# Patient Record
Sex: FEMALE | Race: WHITE | NOT HISPANIC OR LATINO | Employment: FULL TIME | URBAN - METROPOLITAN AREA
[De-identification: names, ages, dates, MRNs, and addresses within clinical notes are randomized per-mention and may not be internally consistent; named-entity substitution may affect disease eponyms.]

---

## 2018-07-12 PROBLEM — R19.7 DIARRHEA: Status: ACTIVE | Noted: 2018-07-12

## 2023-10-16 ENCOUNTER — TELEPHONE (OUTPATIENT)
Dept: PAIN MEDICINE | Facility: CLINIC | Age: 53
End: 2023-10-16
Payer: COMMERCIAL

## 2023-10-16 NOTE — TELEPHONE ENCOUNTER
----- Message from Sivan Easley sent at 10/14/2023  8:21 AM CDT -----  Contact: pt  Type: Needs Medical Advice  Who Called: pt  Best Call Back Number: [phone]    Additional Information: Pt is calling the office needs an appt has referral in.Please call back and advise.

## 2023-10-19 ENCOUNTER — OFFICE VISIT (OUTPATIENT)
Dept: PAIN MEDICINE | Facility: CLINIC | Age: 53
End: 2023-10-19
Payer: COMMERCIAL

## 2023-10-19 VITALS
SYSTOLIC BLOOD PRESSURE: 148 MMHG | HEIGHT: 69 IN | WEIGHT: 181.19 LBS | DIASTOLIC BLOOD PRESSURE: 80 MMHG | HEART RATE: 84 BPM | BODY MASS INDEX: 26.84 KG/M2

## 2023-10-19 DIAGNOSIS — M50.30 DDD (DEGENERATIVE DISC DISEASE), CERVICAL: Primary | ICD-10-CM

## 2023-10-19 DIAGNOSIS — M54.12 CERVICAL RADICULOPATHY: ICD-10-CM

## 2023-10-19 DIAGNOSIS — M54.2 ACUTE NECK PAIN: ICD-10-CM

## 2023-10-19 PROCEDURE — 99204 PR OFFICE/OUTPT VISIT, NEW, LEVL IV, 45-59 MIN: ICD-10-PCS | Mod: S$GLB,,, | Performed by: ANESTHESIOLOGY

## 2023-10-19 PROCEDURE — 3077F SYST BP >= 140 MM HG: CPT | Mod: CPTII,S$GLB,, | Performed by: ANESTHESIOLOGY

## 2023-10-19 PROCEDURE — 99204 OFFICE O/P NEW MOD 45 MIN: CPT | Mod: S$GLB,,, | Performed by: ANESTHESIOLOGY

## 2023-10-19 PROCEDURE — 1159F PR MEDICATION LIST DOCUMENTED IN MEDICAL RECORD: ICD-10-PCS | Mod: CPTII,S$GLB,, | Performed by: ANESTHESIOLOGY

## 2023-10-19 PROCEDURE — 3077F PR MOST RECENT SYSTOLIC BLOOD PRESSURE >= 140 MM HG: ICD-10-PCS | Mod: CPTII,S$GLB,, | Performed by: ANESTHESIOLOGY

## 2023-10-19 PROCEDURE — 3008F BODY MASS INDEX DOCD: CPT | Mod: CPTII,S$GLB,, | Performed by: ANESTHESIOLOGY

## 2023-10-19 PROCEDURE — 3079F DIAST BP 80-89 MM HG: CPT | Mod: CPTII,S$GLB,, | Performed by: ANESTHESIOLOGY

## 2023-10-19 PROCEDURE — 99999 PR PBB SHADOW E&M-EST. PATIENT-LVL IV: CPT | Mod: PBBFAC,,, | Performed by: ANESTHESIOLOGY

## 2023-10-19 PROCEDURE — 3079F PR MOST RECENT DIASTOLIC BLOOD PRESSURE 80-89 MM HG: ICD-10-PCS | Mod: CPTII,S$GLB,, | Performed by: ANESTHESIOLOGY

## 2023-10-19 PROCEDURE — 1159F MED LIST DOCD IN RCRD: CPT | Mod: CPTII,S$GLB,, | Performed by: ANESTHESIOLOGY

## 2023-10-19 PROCEDURE — 3008F PR BODY MASS INDEX (BMI) DOCUMENTED: ICD-10-PCS | Mod: CPTII,S$GLB,, | Performed by: ANESTHESIOLOGY

## 2023-10-19 PROCEDURE — 99999 PR PBB SHADOW E&M-EST. PATIENT-LVL IV: ICD-10-PCS | Mod: PBBFAC,,, | Performed by: ANESTHESIOLOGY

## 2023-10-19 NOTE — PROGRESS NOTES
"  This note was completed with dictation software and grammatical errors may exist.    Chief Complaint   Patient presents with    Neck Pain        HPI: Lisa Easley is a 53 y.o. year old female patient who has a past medical history of Hypertension and Ulcerative colitis. She presents in referral from [facility] for neck pain.  The patient presents with a history of waking up in the middle of the night with left neck, left arm pain for about 1.5 weeks.  She denies any trauma that may have caused this.  She reports that she is having pain in the left neck, into the triceps with numbness and tingling in the 1st 3 fingers of the left hand.  She feels that she has some weakness to the left arm.  She denies any balance issues.  The pain is worse with trying to use her arm, turning her head side to side.  The pain was so severe that she went to the emergency department, received pain medication which eased up the pain slightly.  However she is still having significant pain.    Pain intervention history:    Spine surgeries:    Antineuropathics:  NSAIDs:  Ibuprofen 800  Physical therapy:  She will be starting physical therapy and [facility] PT.  Antidepressants:  Muscle relaxers:  Flexeril 10 mg  Opioids:  Norco 10/325  Antiplatelets/Anticoagulants:        ROS:  She reports neck pain, numbness, left arm weakness, difficulty sleeping.  Balance of review of systems is negative.    No results found for: "LABA1C", "HGBA1C"    Lab Results   Component Value Date    WBC 7.29 2023    HGB 12.0 2023    HCT 37.3 2023    MCV 92 2023     2023             Past Medical History:   Diagnosis Date    Hypertension     Ulcerative colitis        Past Surgical History:   Procedure Laterality Date    BREAST CYST ASPIRATION Left 2020    BREAST CYST ASPIRATION Left 2021     SECTION      x4    COLONOSCOPY N/A 2018    Procedure: COLONOSCOPY;  Surgeon: Santino Harris MD;  Location: " "[facility];  Service: Endoscopy;  Laterality: N/A;    HYSTERECTOMY  2012    Partial    TUMOR REMOVAL      left upper rib cage, benign       Social History     Socioeconomic History    Marital status:    Tobacco Use    Smoking status: Never    Smokeless tobacco: Never   Substance and Sexual Activity    Alcohol use: No         Medications/Allergies: See med card    Vitals:    10/19/23 1052   BP: (!) 148/80   Pulse: 84   Weight: 82.2 kg (181 lb 3.5 oz)   Height: 5' 9" (1.753 m)   PainSc:   4   PainLoc: Neck     Body mass index is 26.76 kg/m².      10/19/2023    10:48 AM   Last 3 PDI Scores   Pain Disability Index (PDI) 24     Physical exam:  Gen: A and O x3, pleasant, well-groomed  Skin: No rashes or obvious lesions  HEENT: PERRLA, no obvious deformities on ears or in canals.Trachea midline.  CVS: Regular rate and rhythm, normal palpable pulses.  Resp: Clear to auscultation bilaterally, no wheezes or rales.  Abdomen: Soft, NT/ND.  Musculoskeletal:  No antalgic gait.       Neuro:  Motor:    Right Left   C4 Shoulder Abduction  5  5   C5 Elbow Flexion    5  5   C6 Wrist Extension  5  5   C7 Elbow Extension   5  4   C8/T1 Hand Intrinsics   5  5   C8 First Dorsal Interosseus  5  5   C8 Abductor Pollicus Brevis  5  5      Left  Right    Triceps DTR 1+ 2+   Biceps DTR 1+ 2+   Brachioradialis DTR 2+ 2+   Patellar DTR 2+ 2+   Achilles DTR 2+ 2+   Dutton Absent  Absent   Clonus Absent Absent     Babinski Absent Absent     Sensory: Intact and symmetrical to light touch and pinprick in C2-T1 dermatomes bilaterally.  Cervical spine: ROM is full in flexion, extension and lateral rotation with increased pain on extension.  Spurling's maneuver causes no neck pain to either side.  Myofascial exam:  There is tenderness palpation over the left trapezius.    Imaging:  She had an x-ray that apparently showed some degenerative changes, we do not have access to the actual images.    Assessment:  Lisa Easley is a 53 y.o. year old " female patient who has a past medical history of Hypertension and Ulcerative colitis. She presents in referral from [facility] for neck pain.  1. DDD (degenerative disc disease), cervical  MRI Cervical Spine Without Contrast      2. Acute neck pain  Ambulatory referral/consult to Pain Clinic    MRI Cervical Spine Without Contrast      3. Cervical radiculopathy  MRI Cervical Spine Without Contrast          Plan:  1. We discussed that she should go ahead and start physical therapy, continue the muscle relaxant, ibuprofen.  However since she is having some weakness in the left arm, I think we should get an MRI of the cervical spine and I will call her once I have seen this and we discussed possibly setting up an epidural steroid injection.      Thank you for referring this interesting patient, and I look forward to continuing to collaborate in her care.

## 2023-10-19 NOTE — LETTER
October 19, 2023      [facility]  [address]  [place] [address]  Phone: [phone]  Fax: [phone]       Patient: Lisa Easley   YOB: 1970  Date of Visit: 10/19/2023    To Whom It May Concern:    Wilmer Easley  was at [facility] on 10/19/2023. TIf you have any questions or concerns, or if I can be of further assistance, please do not hesitate to contact me.    Sincerely,    Evans Ambrocio MA

## 2023-10-22 ENCOUNTER — PATIENT MESSAGE (OUTPATIENT)
Dept: PAIN MEDICINE | Facility: CLINIC | Age: 53
End: 2023-10-22
Payer: COMMERCIAL

## 2023-10-28 ENCOUNTER — HOSPITAL ENCOUNTER (OUTPATIENT)
Dept: RADIOLOGY | Facility: HOSPITAL | Age: 53
Discharge: HOME | End: 2023-10-28
Attending: ANESTHESIOLOGY
Payer: COMMERCIAL

## 2023-10-28 DIAGNOSIS — M54.12 CERVICAL RADICULOPATHY: ICD-10-CM

## 2023-10-28 DIAGNOSIS — M54.2 ACUTE NECK PAIN: ICD-10-CM

## 2023-10-28 DIAGNOSIS — M50.30 DDD (DEGENERATIVE DISC DISEASE), CERVICAL: ICD-10-CM

## 2023-10-28 PROCEDURE — 72141 MRI NECK SPINE W/O DYE: CPT | Mod: TC,PO

## 2023-10-28 PROCEDURE — 72141 MRI CERVICAL SPINE WITHOUT CONTRAST: ICD-10-PCS | Mod: 26,,, | Performed by: RADIOLOGY

## 2023-10-28 PROCEDURE — 72141 MRI NECK SPINE W/O DYE: CPT | Mod: 26,,, | Performed by: RADIOLOGY

## 2023-10-30 ENCOUNTER — TELEPHONE (OUTPATIENT)
Dept: PAIN MEDICINE | Facility: CLINIC | Age: 53
End: 2023-10-30
Payer: COMMERCIAL

## 2023-10-31 ENCOUNTER — PATIENT MESSAGE (OUTPATIENT)
Dept: PAIN MEDICINE | Facility: CLINIC | Age: 53
End: 2023-10-31
Payer: COMMERCIAL

## 2023-10-31 DIAGNOSIS — M54.12 CERVICAL RADICULOPATHY: Primary | ICD-10-CM

## 2023-10-31 RX ORDER — SODIUM CHLORIDE, SODIUM LACTATE, POTASSIUM CHLORIDE, CALCIUM CHLORIDE 600; 310; 30; 20 MG/100ML; MG/100ML; MG/100ML; MG/100ML
INJECTION, SOLUTION INTRAVENOUS CONTINUOUS
Status: CANCELLED | OUTPATIENT
Start: 2023-10-31

## 2023-10-31 NOTE — TELEPHONE ENCOUNTER
Attempted to reach patient. No answer. Left voicemail to return call to office. [facility] message also sent to patient.   *Patient's case request has been placed and patient would need to choose a date.*

## 2023-11-07 ENCOUNTER — PATIENT MESSAGE (OUTPATIENT)
Dept: PAIN MEDICINE | Facility: CLINIC | Age: 53
End: 2023-11-07
Payer: COMMERCIAL

## 2023-11-08 RX ORDER — CYCLOBENZAPRINE HCL 10 MG
10 TABLET ORAL 3 TIMES DAILY
Qty: 30 TABLET | Refills: 0 | Status: SHIPPED | OUTPATIENT
Start: 2023-11-08 | End: 2023-12-21 | Stop reason: SDUPTHER

## 2023-12-01 ENCOUNTER — TELEPHONE (OUTPATIENT)
Dept: PAIN MEDICINE | Facility: CLINIC | Age: 53
End: 2023-12-01
Payer: COMMERCIAL

## 2023-12-04 ENCOUNTER — TELEPHONE (OUTPATIENT)
Dept: PAIN MEDICINE | Facility: CLINIC | Age: 53
End: 2023-12-04
Payer: COMMERCIAL

## 2023-12-04 NOTE — TELEPHONE ENCOUNTER
----- Message from Nhung Louis sent at 12/1/2023 10:30 AM CST -----  Contact: Patient  Type: Needs Medical Advice    Who Called:  Patient  What is this regarding?:  She is wanting to discuss the surgery scheduled on 12/11. It should be cancelled. She wants to go over her MRI first.  Best Call Back Number:  [phone]  Additional Information:  Please call the patient back at the phone number listed above to advise. Thank you!

## 2023-12-08 ENCOUNTER — TELEPHONE (OUTPATIENT)
Dept: SURGERY | Facility: HOSPITAL | Age: 53
End: 2023-12-08
Payer: COMMERCIAL

## 2023-12-08 NOTE — TELEPHONE ENCOUNTER
The injection is not a surgery. If she wants to see me specifically, she will have to get my next available appointment whereas she can see Joe because he has available appointments.

## 2023-12-08 NOTE — TELEPHONE ENCOUNTER
Good morning,     Ms. Easley is currently scheduled for a cervical PAUL C7/T1 to the left with Dr. Kruse on Monday, 12/11.     Ms. Easley states she has attempted to reschedule this procedure several times. She wants to physically meet with Dr. Kruse to talk to him and review her results prior to scheduling any injections or any surgery.     Ms. Easley does currently have an apt on 12/21 @ 9AM with Giacomo. She states she does not want to see Giacomo, she wants to see Dr. Krsue personally for this appointment. Can we please make sure she is able to see Dr. Kruse on this date/time?     She is requesting that we take her off of the schedule for an PAUL at this time until she has had time to see Dr. Kruse and weigh her options. I will ask Lola to place her in the depot at this time.     Thank you!

## 2023-12-13 ENCOUNTER — TELEPHONE (OUTPATIENT)
Dept: PAIN MEDICINE | Facility: CLINIC | Age: 53
End: 2023-12-13
Payer: COMMERCIAL

## 2023-12-13 NOTE — TELEPHONE ENCOUNTER
----- Message from Amberly Clements sent at 12/12/2023  3:22 PM CST -----  Type:  Patient Returning Call    Who Called:pt     Who Left Message for Patient:Abigail Chan     Does the patient know what this is regarding?:no     Would the patient rather a call back or a response via [facility]? Callback    Best Call Back Number: [phone]      Additional Information:  please advise thank you

## 2023-12-18 ENCOUNTER — TELEPHONE (OUTPATIENT)
Dept: PAIN MEDICINE | Facility: CLINIC | Age: 53
End: 2023-12-18
Payer: COMMERCIAL

## 2023-12-18 NOTE — TELEPHONE ENCOUNTER
As narrow as her spinal canal is, I would not recommend doing traction.  I would not recommend this device.

## 2023-12-21 ENCOUNTER — OFFICE VISIT (OUTPATIENT)
Dept: PAIN MEDICINE | Facility: CLINIC | Age: 53
End: 2023-12-21
Payer: COMMERCIAL

## 2023-12-21 VITALS
HEART RATE: 62 BPM | DIASTOLIC BLOOD PRESSURE: 77 MMHG | WEIGHT: 180.75 LBS | BODY MASS INDEX: 26.77 KG/M2 | HEIGHT: 69 IN | SYSTOLIC BLOOD PRESSURE: 165 MMHG

## 2023-12-21 DIAGNOSIS — M50.30 DDD (DEGENERATIVE DISC DISEASE), CERVICAL: ICD-10-CM

## 2023-12-21 DIAGNOSIS — M54.12 CERVICAL RADICULOPATHY: Primary | ICD-10-CM

## 2023-12-21 PROCEDURE — 3008F BODY MASS INDEX DOCD: CPT | Mod: CPTII,S$GLB,, | Performed by: PHYSICIAN ASSISTANT

## 2023-12-21 PROCEDURE — 99999 PR PBB SHADOW E&M-EST. PATIENT-LVL III: CPT | Mod: PBBFAC,,, | Performed by: PHYSICIAN ASSISTANT

## 2023-12-21 PROCEDURE — 1160F RVW MEDS BY RX/DR IN RCRD: CPT | Mod: CPTII,S$GLB,, | Performed by: PHYSICIAN ASSISTANT

## 2023-12-21 PROCEDURE — 99214 OFFICE O/P EST MOD 30 MIN: CPT | Mod: S$GLB,,, | Performed by: PHYSICIAN ASSISTANT

## 2023-12-21 PROCEDURE — 3077F SYST BP >= 140 MM HG: CPT | Mod: CPTII,S$GLB,, | Performed by: PHYSICIAN ASSISTANT

## 2023-12-21 PROCEDURE — 99214 PR OFFICE/OUTPT VISIT, EST, LEVL IV, 30-39 MIN: ICD-10-PCS | Mod: S$GLB,,, | Performed by: PHYSICIAN ASSISTANT

## 2023-12-21 PROCEDURE — 3078F DIAST BP <80 MM HG: CPT | Mod: CPTII,S$GLB,, | Performed by: PHYSICIAN ASSISTANT

## 2023-12-21 PROCEDURE — 1160F PR REVIEW ALL MEDS BY PRESCRIBER/CLIN PHARMACIST DOCUMENTED: ICD-10-PCS | Mod: CPTII,S$GLB,, | Performed by: PHYSICIAN ASSISTANT

## 2023-12-21 PROCEDURE — 3008F PR BODY MASS INDEX (BMI) DOCUMENTED: ICD-10-PCS | Mod: CPTII,S$GLB,, | Performed by: PHYSICIAN ASSISTANT

## 2023-12-21 PROCEDURE — 99999 PR PBB SHADOW E&M-EST. PATIENT-LVL III: ICD-10-PCS | Mod: PBBFAC,,, | Performed by: PHYSICIAN ASSISTANT

## 2023-12-21 PROCEDURE — 1159F MED LIST DOCD IN RCRD: CPT | Mod: CPTII,S$GLB,, | Performed by: PHYSICIAN ASSISTANT

## 2023-12-21 PROCEDURE — 3077F PR MOST RECENT SYSTOLIC BLOOD PRESSURE >= 140 MM HG: ICD-10-PCS | Mod: CPTII,S$GLB,, | Performed by: PHYSICIAN ASSISTANT

## 2023-12-21 PROCEDURE — 3078F PR MOST RECENT DIASTOLIC BLOOD PRESSURE < 80 MM HG: ICD-10-PCS | Mod: CPTII,S$GLB,, | Performed by: PHYSICIAN ASSISTANT

## 2023-12-21 PROCEDURE — 1159F PR MEDICATION LIST DOCUMENTED IN MEDICAL RECORD: ICD-10-PCS | Mod: CPTII,S$GLB,, | Performed by: PHYSICIAN ASSISTANT

## 2023-12-21 RX ORDER — CYCLOBENZAPRINE HCL 10 MG
10 TABLET ORAL 3 TIMES DAILY
Qty: 30 TABLET | Refills: 0 | Status: SHIPPED | OUTPATIENT
Start: 2023-12-21

## 2023-12-31 NOTE — PROGRESS NOTES
This note was completed with dictation software and grammatical errors may exist.    Chief Complaint   Patient presents with    Neck Pain        HPI: Lisa Easley is a 53 y.o. year old female patient who has a past medical history of Hypertension and Ulcerative colitis. She presents in referral from No ref. provider found for neck pain.    She returns in follow-up today with neck pain.  The patient is new to me.  She does report some tightness in her neck when turning to the left side.  She also reports occasional muscle spasms in her left upper extremity.  She feels that her left shoulder and arm pain has improved with physical therapy.  She does have relief with a hot water bottle as well.  The numbness and tingling in her left 1st digit is gone.  She also reports left upper extremity weakness drops things but this is improving.  Overall she feels she is 90% better with physical therapy, time and muscle relaxers.      Initial history:The patient presents with a history of waking up in the middle of the night with left neck, left arm pain for about 1.5 weeks.  She denies any trauma that may have caused this.  She reports that she is having pain in the left neck, into the triceps with numbness and tingling in the 1st 3 fingers of the left hand.  She feels that she has some weakness to the left arm.  She denies any balance issues.  The pain is worse with trying to use her arm, turning her head side to side.  The pain was so severe that she went to the emergency department, received pain medication which eased up the pain slightly.  However she is still having significant pain.    Pain intervention history:    Spine surgeries:    Antineuropathics:  NSAIDs:  Ibuprofen 800  Physical therapy:  She will be starting physical therapy and [facility] PT.  Antidepressants:  Muscle relaxers:  Flexeril 10 mg  Opioids:  Norco 10/325  Antiplatelets/Anticoagulants:    ROS:  She reports neck pain, numbness, left arm weakness,  "difficulty sleeping.  Balance of review of systems is negative.    No results found for: "LABA1C", "HGBA1C"    Lab Results   Component Value Date    WBC 7.29 2023    HGB 12.0 2023    HCT 37.3 2023    MCV 92 2023     2023         Past Medical History:   Diagnosis Date    Hypertension     Ulcerative colitis        Past Surgical History:   Procedure Laterality Date    BREAST CYST ASPIRATION Left 2020    BREAST CYST ASPIRATION Left 2021     SECTION      x4    COLONOSCOPY N/A 2018    Procedure: COLONOSCOPY;  Surgeon: Santino Harris MD;  Location: [facility];  Service: Endoscopy;  Laterality: N/A;    HYSTERECTOMY  2012    Partial    TUMOR REMOVAL      left upper rib cage, benign       Social History     Socioeconomic History    Marital status:    Tobacco Use    Smoking status: Never    Smokeless tobacco: Never   Substance and Sexual Activity    Alcohol use: No         Medications/Allergies: See med card    Vitals:    23 0851   BP: (!) 165/77   Pulse: 62   Weight: 82 kg (180 lb 12.4 oz)   Height: 5' 9" (1.753 m)   PainSc:   7   PainLoc: Neck     Body mass index is 26.7 kg/m².      2023     8:48 AM 10/19/2023    10:48 AM   Last 3 PDI Scores   Pain Disability Index (PDI) 24 24     Physical exam:  Gen: A and O x3, pleasant, well-groomed  Skin: No rashes or obvious lesions  HEENT: PERRLA, no obvious deformities on ears or in canals.Trachea midline.  CVS: Regular rate and rhythm, normal palpable pulses.  Resp: Clear to auscultation bilaterally, no wheezes or rales.  Abdomen: Soft, NT/ND.  Musculoskeletal:  No antalgic gait.       Neuro:  Motor:    Right Left   C4 Shoulder Abduction  5  5   C5 Elbow Flexion    5  5   C6 Wrist Extension  5  5   C7 Elbow Extension   5  4   C8/T1 Hand Intrinsics   5  5   C8 First Dorsal Interosseus  5  5   C8 Abductor Pollicus Brevis  5  5      Left  Right    Triceps DTR 1+ 2+   Biceps DTR 1+ 2+   Brachioradialis DTR 2+ " 2+   Patellar DTR 2+ 2+   Achilles DTR 2+ 2+   Dutton Absent  Absent   Clonus Absent Absent     Babinski Absent Absent     Sensory: Intact and symmetrical to light touch and pinprick in C2-T1 dermatomes bilaterally.  Cervical spine: ROM is full in flexion, extension and lateral rotation with increased pain on extension.  Spurling's maneuver causes no neck pain to either side.  Myofascial exam:  There is tenderness palpation over the left trapezius.    Imaging:  She had an x-ray that apparently showed some degenerative changes, we do not have access to the actual images.      Imaging:    10/28/2023 MRI cervical spine   Morphology: Mild active inflammatory facet arthropathy changes on the left at C4-C5 demonstrating mild marrow edema.  Marrow signal is otherwise within normal limits and vertebral body heights are preserved.  No fractures.     Alignment: Smooth cervical kyphosis and trace anterolisthesis of C3 on C4 and C4 on C5 and retrolisthesis of C5 on C6..     Cord: Slight flattening of the ventral cord surface at C5-C6 with preservation of the posterior CSF spaces.  No cord edema..     Craniocervical Junction: Cerebellar tonsils are normally positioned. The visualized portions of the posterior fossa are unremarkable. The regional osseous anatomy is normal.        Disc levels:        C2-C3: Moderate facet arthrosis contributes to mild right foraminal narrowing.  The spinal canal and left foramen are patent..     C3-C4: Shallow broad-based disc osteophyte complex and moderate left-sided facet arthrosis with uncovertebral spurring contributing to moderate left foraminal and minimal right foraminal narrowing.  Mild narrowing of the spinal canal..     C4-C5: Shallow broad-based disc osteophyte complex and mild bilateral facet arthrosis with uncovertebral spurring contributing to mild bilateral foraminal narrowing.  No substantial narrowing of the spinal canal..     C5-C6: Mild degenerative disc height loss and  broad-based disc osteophyte complex as well as cervical kyphosis contributing to mild narrowing of the spinal canal and slight flattening of the ventral cord surface.  Uncovertebral spurring and mild facet arthrosis contributes to moderate to severe left and moderate right foraminal narrowing..     C6-C7: Mild degenerative disc height loss and shallow disc osteophyte complex as well as mild ligamentum flavum thickening mildly narrowing the spinal canal.  Facet arthrosis contributes to mild to moderate left and minimal right foraminal narrowing..     C7-T1: Within normal limits.     Soft tissues: The visualized paraspinal soft tissues are within normal limits.      Assessment:  Lisa Easley is a 53 y.o. year old female patient who has a past medical history of Hypertension and Ulcerative colitis. She presents in referral from [facility] for neck pain.  1. Cervical radiculopathy        2. DDD (degenerative disc disease), cervical            Plan:    1.  Overall she is 90% better with physical therapy, muscle relaxers and time.  She is going to continue her home exercise program.  I have refilled Flexeril.  She can contact us for a cervical epidural steroid injection if necessary.  2. She will follow-up as needed.